# Patient Record
Sex: FEMALE | Race: WHITE | Employment: OTHER | URBAN - METROPOLITAN AREA
[De-identification: names, ages, dates, MRNs, and addresses within clinical notes are randomized per-mention and may not be internally consistent; named-entity substitution may affect disease eponyms.]

---

## 2018-03-01 ENCOUNTER — HOSPITAL ENCOUNTER (OUTPATIENT)
Dept: MAMMOGRAPHY | Age: 73
Discharge: HOME | End: 2018-03-03
Payer: MEDICARE

## 2018-03-01 DIAGNOSIS — M81.0 OSTEOPOROSIS, POST-MENOPAUSAL: ICD-10-CM

## 2018-03-01 DIAGNOSIS — Z12.31 VISIT FOR SCREENING MAMMOGRAM: ICD-10-CM

## 2018-03-01 PROCEDURE — 77080 DXA BONE DENSITY AXIAL: CPT

## 2018-03-01 PROCEDURE — 77067 SCR MAMMO BI INCL CAD: CPT

## 2019-01-24 PROBLEM — I49.3 PVC (PREMATURE VENTRICULAR CONTRACTION): Status: ACTIVE | Noted: 2019-01-24

## 2019-03-04 ENCOUNTER — HOSPITAL ENCOUNTER (OUTPATIENT)
Dept: MAMMOGRAPHY | Age: 74
Discharge: HOME | End: 2019-03-06
Payer: MEDICARE

## 2019-03-04 ENCOUNTER — HOSPITAL ENCOUNTER (OUTPATIENT)
Dept: GENERAL RADIOLOGY | Facility: CLINIC | Age: 74
Discharge: HOME | End: 2019-03-06
Payer: MEDICARE

## 2019-03-04 DIAGNOSIS — M54.2 NECK PAIN: ICD-10-CM

## 2019-03-04 DIAGNOSIS — Z12.31 ENCOUNTER FOR SCREENING MAMMOGRAM FOR BREAST CANCER: ICD-10-CM

## 2019-03-04 DIAGNOSIS — R29.898 LEFT ARM WEAKNESS: ICD-10-CM

## 2019-03-04 PROCEDURE — 77063 BREAST TOMOSYNTHESIS BI: CPT

## 2019-03-04 PROCEDURE — 72040 X-RAY EXAM NECK SPINE 2-3 VW: CPT

## 2019-03-09 ENCOUNTER — HOSPITAL ENCOUNTER (OUTPATIENT)
Dept: MRI IMAGING | Age: 74
Discharge: HOME | End: 2019-03-11
Payer: MEDICARE

## 2019-03-09 DIAGNOSIS — R29.898 LEFT ARM WEAKNESS: ICD-10-CM

## 2019-03-09 DIAGNOSIS — M54.2 NECK PAIN: ICD-10-CM

## 2019-03-09 PROCEDURE — 72141 MRI NECK SPINE W/O DYE: CPT

## 2019-08-05 PROBLEM — E86.9 VOLUME DEPLETION: Status: ACTIVE | Noted: 2017-06-26

## 2019-08-05 PROBLEM — D49.0 NEOPLASM OF APPENDIX: Status: ACTIVE | Noted: 2017-06-26

## 2019-08-05 PROBLEM — I49.3 VENTRICULAR PREMATURE BEATS: Status: ACTIVE | Noted: 2017-06-26

## 2019-08-05 PROBLEM — K59.09 CHRONIC CONSTIPATION: Status: ACTIVE | Noted: 2017-06-26

## 2019-08-05 PROBLEM — M25.559 HIP PAIN: Status: ACTIVE | Noted: 2017-06-12

## 2019-08-05 PROBLEM — M85.80 OSTEOPENIA: Status: ACTIVE | Noted: 2017-06-26

## 2019-08-05 PROBLEM — R79.89 OTHER SPECIFIED ABNORMAL FINDINGS OF BLOOD CHEMISTRY: Status: ACTIVE | Noted: 2019-04-30

## 2019-08-05 PROBLEM — E78.5 HYPERLIPIDEMIA: Status: ACTIVE | Noted: 2018-01-24

## 2019-08-05 PROBLEM — R10.84 GENERALIZED ABDOMINAL PAIN: Status: ACTIVE | Noted: 2019-04-30

## 2019-08-05 PROBLEM — M81.0 SENILE OSTEOPOROSIS: Status: ACTIVE | Noted: 2019-02-01

## 2020-03-16 ENCOUNTER — HOSPITAL ENCOUNTER (OUTPATIENT)
Dept: MAMMOGRAPHY | Age: 75
Discharge: HOME | End: 2020-03-18
Payer: MEDICARE

## 2020-03-16 PROCEDURE — 77063 BREAST TOMOSYNTHESIS BI: CPT

## 2020-03-16 PROCEDURE — 77080 DXA BONE DENSITY AXIAL: CPT

## 2020-08-24 PROBLEM — M54.12 CERVICAL RADICULOPATHY: Status: ACTIVE | Noted: 2020-08-24

## 2021-04-07 ENCOUNTER — HOSPITAL ENCOUNTER (OUTPATIENT)
Dept: MAMMOGRAPHY | Age: 76
Discharge: HOME | End: 2021-04-09
Payer: MEDICARE

## 2021-04-07 DIAGNOSIS — Z12.31 ENCOUNTER FOR SCREENING MAMMOGRAM FOR BREAST CANCER: ICD-10-CM

## 2021-04-07 PROCEDURE — 77063 BREAST TOMOSYNTHESIS BI: CPT

## 2021-05-25 ENCOUNTER — HOSPITAL ENCOUNTER (EMERGENCY)
Facility: CLINIC | Age: 76
Discharge: HOME | End: 2021-05-26
Attending: EMERGENCY MEDICINE
Payer: MEDICARE

## 2021-05-25 ENCOUNTER — APPOINTMENT (OUTPATIENT)
Dept: GENERAL RADIOLOGY | Facility: CLINIC | Age: 76
End: 2021-05-25
Payer: MEDICARE

## 2021-05-25 VITALS
HEART RATE: 71 BPM | OXYGEN SATURATION: 96 % | BODY MASS INDEX: 28.93 KG/M2 | DIASTOLIC BLOOD PRESSURE: 91 MMHG | HEIGHT: 66 IN | TEMPERATURE: 97.9 F | WEIGHT: 180 LBS | SYSTOLIC BLOOD PRESSURE: 129 MMHG | RESPIRATION RATE: 15 BRPM

## 2021-05-25 DIAGNOSIS — S20.212A CONTUSION OF RIB ON LEFT SIDE, INITIAL ENCOUNTER: ICD-10-CM

## 2021-05-25 DIAGNOSIS — S20.212A CONTUSION OF LEFT CHEST WALL, INITIAL ENCOUNTER: Primary | ICD-10-CM

## 2021-05-25 PROCEDURE — 99284 EMERGENCY DEPT VISIT MOD MDM: CPT

## 2021-05-25 PROCEDURE — 93005 ELECTROCARDIOGRAM TRACING: CPT | Performed by: EMERGENCY MEDICINE

## 2021-05-25 PROCEDURE — 71101 X-RAY EXAM UNILAT RIBS/CHEST: CPT

## 2021-05-25 ASSESSMENT — PAIN DESCRIPTION - PAIN TYPE: TYPE: ACUTE PAIN

## 2021-05-25 ASSESSMENT — PAIN DESCRIPTION - ORIENTATION: ORIENTATION: LEFT

## 2021-05-25 ASSESSMENT — PAIN DESCRIPTION - DESCRIPTORS: DESCRIPTORS: SHARP

## 2021-05-26 ASSESSMENT — ENCOUNTER SYMPTOMS
RHINORRHEA: 0
DIARRHEA: 0
EYE PAIN: 0
BACK PAIN: 0
NAUSEA: 0
SORE THROAT: 0
SHORTNESS OF BREATH: 0
ABDOMINAL PAIN: 0
VOMITING: 0
COUGH: 0

## 2021-05-26 NOTE — ED PROVIDER NOTES
(ADDERALL XR) 10 MG extended release capsule Take 20 mg by mouth every morning. vilazodone HCl (VILAZODONE HCL) 20 MG TABS Take 20 mg by mouth daily      atorvastatin (LIPITOR) 10 MG tablet Take 1 tablet by mouth daily  Qty: 90 tablet, Refills: 3      magnesium oxide (MAG-OX) 400 MG tablet Take 400 mg by mouth daily      polyethylene glycol (GLYCOLAX) 17 GM/SCOOP powder Take 17 g by mouth daily      denosumab (PROLIA) 60 MG/ML SOSY SC injection Inject 60 mg into the skin Indications: every 6 months      dilTIAZem (CARDIZEM CD) 120 MG extended release capsule Take 1 capsule by mouth daily  Qty: 90 capsule, Refills: 3      calcium carbonate (OSCAL) 500 MG TABS tablet Take 1,200 mg by mouth daily       aspirin 325 MG tablet Take 81 mg by mouth daily       Biotin 10 MG CAPS Take 1 capsule by mouth daily. ALLERGIES     has No Known Allergies. FAMILY HISTORY     She indicated that the status of her mother is unknown. She indicated that the status of her father is unknown.     family history includes Cancer in her brother and father; Diabetes in her brother, father, and sister; High Cholesterol in her father; Hypertension in her brother, father, and sister; Osteoarthritis in her mother; Stroke in her father; Substance Abuse in her sister. SOCIAL HISTORY      reports that she quit smoking about 33 years ago. Her smoking use included cigarettes. She has a 2.50 pack-year smoking history. She has never used smokeless tobacco. She reports current alcohol use of about 11.7 standard drinks of alcohol per week. She reports that she does not use drugs. PHYSICAL EXAM     INITIAL VITALS:  height is 5' 6\" (1.676 m) and weight is 81.6 kg (180 lb). Her oral temperature is 97.9 °F (36.6 °C). Her blood pressure is 129/91 (abnormal) and her pulse is 71. Her respiration is 15 and oxygen saturation is 96%. Physical Exam  Vitals reviewed. Constitutional:       General: She is not in acute distress. placed on the left lateral rib cage. No rib fracture is   detected. No other acute intrathoracic pathology. No results found. LABS:  No results found for this visit on 05/25/21. EMERGENCY DEPARTMENT COURSE:     The patient was given the following medications:  No orders of the defined types were placed in this encounter. Vitals:    Vitals:    05/25/21 2242   BP: (!) 129/91   Pulse: 71   Resp: 15   Temp: 97.9 °F (36.6 °C)   TempSrc: Oral   SpO2: 96%   Weight: 81.6 kg (180 lb)   Height: 5' 6\" (1.676 m)     -------------------------  BP: (!) 129/91, Temp: 97.9 °F (36.6 °C), Pulse: 71, Resp: 15      Re-evaluation Notes    Patient doing well on reevaluation. No acute changes. I did compare today's ECG with the prior ECG and see no acute significant changes. X-ray shows no acute obvious findings. Clinically I feel she has a rib contusion versus small undetectable fracture via x-ray. No obvious gross rib fracture or deformity. No signs of pneumothorax. I feel she is appropriate for discharge and outpatient follow-up. Advised to follow-up with her PCP return right away if worsening or for new or concerning symptoms. I discussed all this with the patient and she is comfortable/agreeable with the plan. I do not feel she requires further cardiac work-up. I feel this is trauma induced. The patient presents with chest pain that is not suggesting in nature of pulmonary embolus, aortic dissection, cardiac ischemia, or other serious etiology. I considered an aortic dissection, but this is unlikely as patient is not complaining of tearing or ripping chest pain that is radiating to the back, the patient has no new neurological abnormalities and pulses are equal to all extremities. Mediastinum is within normal limits. Patient appears comfortable on physical exam and is not in distress. I also thought about a cardiac tamponade, but this is unlikely as patient is hemodynamically stable.  Heart sounds are not distant, EKG does not show signs of electrical alternans and there is no JVD. I also thought about a tension pneumothorax, but this is unlikely given bilateral breath sounds and no signs of hemodynamic instability. I do not feel the patient has a PE. No clinical evidence of DVT. I thought about an esophageal perforation, but history and physical exam does not suggest vomiting, followed by chest pain. No signs of Hamman's crunch on physical exam; again, patient appears comfortable and is well appearing and non toxic. The patient has been instructed to return if the symptpoms change or worsen in any way. Given the extremely low risk of these diagnoses further testing and evaluation for these possibilites are not indicated at this time. The patient appears stable for discharge and has been instructed to return immediately if the symptoms worsen in any way, or in 8-12 hr if not improved for re-evaluation. We also discussed returning to the Emergency Department immediately if new or worsening symptoms occur. We have discussed the symptoms which are most concerning (e.g., worsening pain, shortness of breath, a feeling of passing out, fever, any neurologic symptoms, abdominal pain or vomiting) that necessitate immediate return. The patient understands that at this time there is no evidence for a more malignant underlying process, but the patient also understands that early in the process of an illness or injury, an emergency department workup can be falsely reassuring. Routine discharge counseling was given, and the patient understands that worsening, changing or persistent symptoms should prompt an immediate call or follow up with their primary physician or return to the emergency department. The importance of appropriate follow up was also discussed. I have reviewed the disposition diagnosis with the patient and or their family/guardian.   I have answered their questions and given discharge instructions. They voiced understanding of these instructions and did not have any further questions or complaints. CONSULTS:    None    CRITICAL CARE:     None    PROCEDURES:    None    FINAL IMPRESSION      1. Contusion of left chest wall, initial encounter    2.  Contusion of rib on left side, initial encounter          DISPOSITION/PLAN   DISPOSITION        Condition on Disposition    Improved    PATIENT REFERRED TO:  Chelsea Chairez DO  [address] [facility] [address]  [phone]    Schedule an appointment as soon as possible for a visit in 2 days        DISCHARGE MEDICATIONS:  Current Discharge Medication List          (Please note that portions of this note were completed with a voice recognition program.  Efforts were made to edit the dictations but occasionally words are mis-transcribed.)    Hood Mackey DO, DO  Attending Emergency Physician       Hood Mackey DO  05/26/21 Dae Kwon DO  05/26/21 0155

## 2021-05-26 NOTE — ED NOTES
Pt presents to the ED via private auto. Pt states she fell while walking her dog onto her left side.  Pt c/o left rib pain     Myranda Garcia RN  05/26/21 [id number]

## 2021-05-27 LAB
EKG ATRIAL RATE: 65 BPM
EKG P AXIS: 43 DEGREES
EKG P-R INTERVAL: 200 MS
EKG Q-T INTERVAL: 432 MS
EKG QRS DURATION: 88 MS
EKG QTC CALCULATION (BAZETT): 449 MS
EKG R AXIS: 52 DEGREES
EKG T AXIS: 55 DEGREES
EKG VENTRICULAR RATE: 65 BPM

## 2021-09-06 ENCOUNTER — HOSPITAL ENCOUNTER (EMERGENCY)
Facility: CLINIC | Age: 76
Discharge: HOME | End: 2021-09-06
Attending: EMERGENCY MEDICINE
Payer: MEDICARE

## 2021-09-06 VITALS
HEIGHT: 66 IN | BODY MASS INDEX: 28.93 KG/M2 | OXYGEN SATURATION: 97 % | TEMPERATURE: 97.8 F | HEART RATE: 62 BPM | DIASTOLIC BLOOD PRESSURE: 89 MMHG | WEIGHT: 180 LBS | RESPIRATION RATE: 17 BRPM | SYSTOLIC BLOOD PRESSURE: 153 MMHG

## 2021-09-06 DIAGNOSIS — L23.7 POISON IVY DERMATITIS: Primary | ICD-10-CM

## 2021-09-06 PROCEDURE — 99283 EMERGENCY DEPT VISIT LOW MDM: CPT

## 2021-09-06 PROCEDURE — 6370000000 HC RX 637 (ALT 250 FOR IP): Performed by: EMERGENCY MEDICINE

## 2021-09-06 RX ORDER — PREDNISONE 20 MG/1
60 TABLET ORAL ONCE
Status: COMPLETED | OUTPATIENT
Start: 2021-09-06 | End: 2021-09-06

## 2021-09-06 RX ORDER — PREDNISONE 50 MG/1
50 TABLET ORAL DAILY
Qty: 4 TABLET | Refills: 0 | Status: SHIPPED | OUTPATIENT
Start: 2021-09-07 | End: 2021-09-10

## 2021-09-06 RX ADMIN — PREDNISONE 60 MG: 20 TABLET ORAL at 08:45

## 2021-09-06 NOTE — ED TRIAGE NOTES
Pt to ED with steady gait with c/e right sided facial swelling, and rash on bilat arms. Pt reports two days ago she came into contactwith poisonous plant while gardening. Pt reports itching, and swelling. Pt denies vision changes. Airway remains in tact. Pt is alert and oriented, NAD, RR even and unlabored.

## 2021-09-06 NOTE — ED PROVIDER NOTES
[facility]  [address] [facility]  Phone: [address] [place]      Pt Name: Prosper Shirley  MRN: [id number]  [place] 1945  Date of evaluation: 9/6/2021    CHIEF COMPLAINT       Chief Complaint   Patient presents with    Facial Swelling     pt reports came into contact with some plant while gardening two days ago and now has swelling on right side of face and eye, denies vision changes    Rash     to bilat arms and right side of face       HISTORY OF PRESENT ILLNESS    Prosper Shirley is a 67 y.o. female who presents to the emergency department planing of a rash to her right lower lid right side of her face and bilateral arms in the antecubital fossa's. She was doing some gardening/weeding. Symptoms started about 2 days ago. No blurry vision or double vision. She has some crusting near her right eye but not involving it. She denies any difficulty swallowing or difficulty breathing no other complaints. 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 SURGICAL HISTORY      has a past surgical history that includes Small intestine surgery; Carpal tunnel release; Eye surgery; Appendectomy; Gallbladder surgery; jorge l and bso (cervix removed); Total hip arthroplasty (Bilateral); and Neck surgery (07/2019).     CURRENT MEDICATIONS       Previous Medications    AMPHETAMINE-DEXTROAMPHETAMINE (ADDERALL XR) 20 MG EXTENDED RELEASE CAPSULE    Take 1 capsule by mouth daily. ASPIRIN 325 MG TABLET    Take 81 mg by mouth daily     ATORVASTATIN (LIPITOR) 10 MG TABLET    Take 1 tablet by mouth daily    BIOTIN 10 MG CAPS    Take 1 capsule by mouth daily. CALCIUM CARBONATE (OSCAL) 500 MG TABS TABLET    Take 1,200 mg by mouth daily     DENOSUMAB (PROLIA) 60 MG/ML SOSY SC INJECTION    Inject 60 mg into the skin Indications: every 6 months    DILTIAZEM (CARDIZEM CD) 120 MG EXTENDED RELEASE CAPSULE    Take 1 capsule by mouth daily    MAGNESIUM OXIDE (MAG-OX) 400 MG TABLET    Take 400 mg by mouth daily    POLYETHYLENE GLYCOL (GLYCOLAX) 17 GM/SCOOP POWDER    Take 17 g by mouth daily    TIZANIDINE (ZANAFLEX) 2 MG TABLET    Take 1 tablet by mouth every 8 hours as needed (muscle pain)    VILAZODONE HCL (VILAZODONE HCL) 20 MG TABS    Take 20 mg by mouth daily       ALLERGIES     has No Known Allergies. FAMILY HISTORY     She indicated that the status of her mother is unknown. She indicated that the status of her father is unknown.     family history includes Cancer in her brother and father; Diabetes in her brother, father, and sister; High Cholesterol in her father; Hypertension in her brother, father, and sister; Osteoarthritis in her mother; Stroke in her father; Substance Abuse in her sister. SOCIAL HISTORY      reports that she quit smoking about 33 years ago. Her smoking use included cigarettes. She has a 2.50 pack-year smoking history. She has never used smokeless tobacco. She reports current alcohol use of about 11.7 standard drinks of alcohol per week. She reports that she does not use drugs.     PHYSICAL EXAM       ED Triage Vitals [09/06/21 0833]   BP Temp Temp Source Pulse Resp SpO2 Height Weight   (!) 153/89 97.8 °F (36.6 °C) Oral 62 17 97 % 5' 6\" (1.676 m) 180 lb (81.6 kg)       Constitutional: Alert, oriented x3, nontoxic, answering questions appropriately, acting properly for age, in no acute distress   HEENT: Extraocular muscles intact, mucus membranes moist, no angioedema no uvular edema no o posterior no pharyngeal erythema or exudates, Pupils equal, round, reactive to light, rash consistent with poison ivy in the right side of the face over the maxillary sinus and right lower lid  Neck: Trachea midline no stridor or meningismus  Cardiovascular: Regular rhythm and rate no S3, S4, or murmurs   Respiratory: Clear to auscultation bilaterally no wheezes, rhonchi, rales, no respiratory distress no tachypnea no retractions no hypoxia  Gastrointestinal: Soft, nontender, nondistended, positive bowel sounds. No rebound, rigidity, or guarding. Musculoskeletal: No extremity pain or swelling   Neurologic: Moving all 4 extremities without difficulty there are no gross focal neurologic deficits   Skin: Warm and dry rash consistent with poison ivy bilateral antecubital fossa's left greater than right. Erythematous blanching pruritic      DIFFERENTIAL DIAGNOSIS/ MDM:     Rash consistent with poison ivy. Prednisone. May take Benadryl as directed as needed. May use calamine. Follow-up with family physician return if worsening symptoms or any other concerns. DIAGNOSTIC RESULTS     EKG: All EKG's are interpreted by the Emergency Department Physician who either signs or Co-signs this chart in the absence of a cardiologist.        Not indicated unless otherwise documented above    LABS:  No results found for this visit on 09/06/21.     Not indicated unless otherwise documented above    RADIOLOGY:   I reviewed the radiologist interpretations:    No orders to display       Not indicated unless otherwise documented above    EMERGENCY DEPARTMENT COURSE:     The patient was given the following medications:  Orders Placed This Encounter   Medications    predniSONE (DELTASONE) tablet 60 mg    predniSONE (DELTASONE) 50 MG tablet     Sig: Take 1 tablet by mouth daily for 4 days     Dispense:  4 tablet     Refill:  0        Vitals:   -------------------------  BP (!) 153/89   Pulse 62   Temp 97.8 °F (36.6 °C) (Oral)   Resp 17   Ht 5' 6\" (1.676 m)   Wt 81.6 kg (180 lb)   SpO2 97%   BMI 29.05 kg/m²           The patient understands that at this time there is no evidence for a more malignant underlying process, but also understands that early in the process of an illness or injury, an emergency department workup can be falsely reassuring. Routine discharge counseling was given, and it is understood that worsening, changing or persistent symptoms should prompt an immediate call or follow up with their primary physician or return to the emergency department. The importance of appropriate follow up was also discussed. I have reviewed the disposition diagnosis. I have answered the questions and given discharge instructions. There was voiced understanding of these instructions and no further questions or complaints. CRITICAL CARE:    None    CONSULTS:    None    PROCEDURES:    None      OARRS Report if indicated             FINAL IMPRESSION      1.  Poison ivy dermatitis          DISPOSITION/PLAN   DISPOSITION Discharge - Pending Orders Complete 09/06/2021 08:43:19 AM        CONDITION ON DISPOSITION: STABLE       PATIENT REFERRED TO:  Jr Langley DO  [address] [place], [place] [id number] [phone]    Schedule an appointment as soon as possible for a visit in 3 days        DISCHARGE MEDICATIONS:  New Prescriptions    PREDNISONE (DELTASONE) 50 MG TABLET    Take 1 tablet by mouth daily for 4 days       (Please note that portions of this note were completed with a voice recognition program.  Efforts were made to edit the dictations but occasionally words are mis-transcribed.)    Cherelle Zepeda DO   Attending Emergency Physician      Cherelle Zepeda DO  09/06/21 3393

## 2022-01-03 ENCOUNTER — HOSPITAL ENCOUNTER (OUTPATIENT)
Facility: CLINIC | Age: 77
Discharge: HOME | End: 2022-01-03
Payer: MEDICARE

## 2022-01-03 DIAGNOSIS — R73.01 IMPAIRED FASTING GLUCOSE: ICD-10-CM

## 2022-01-03 DIAGNOSIS — E78.2 MIXED HYPERLIPIDEMIA: ICD-10-CM

## 2022-01-03 LAB
ALBUMIN SERPL-MCNC: 4.4 G/DL (ref 3.5–5.2)
ALBUMIN/GLOBULIN RATIO: 1.7 (ref 1–2.5)
ALP BLD-CCNC: 55 U/L (ref 35–104)
ALT SERPL-CCNC: 11 U/L (ref 5–33)
ANION GAP SERPL CALCULATED.3IONS-SCNC: 13 MMOL/L (ref 9–17)
AST SERPL-CCNC: 20 U/L
BILIRUB SERPL-MCNC: 0.41 MG/DL (ref 0.3–1.2)
BUN BLDV-MCNC: 17 MG/DL (ref 8–23)
BUN/CREAT BLD: NORMAL (ref 9–20)
CALCIUM SERPL-MCNC: 9.3 MG/DL (ref 8.6–10.4)
CHLORIDE BLD-SCNC: 102 MMOL/L (ref 98–107)
CHOLESTEROL/HDL RATIO: 2.7
CHOLESTEROL: 175 MG/DL
CO2: 24 MMOL/L (ref 20–31)
CREAT SERPL-MCNC: 0.66 MG/DL (ref 0.5–0.9)
GFR AFRICAN AMERICAN: >60 ML/MIN
GFR NON-AFRICAN AMERICAN: >60 ML/MIN
GFR SERPL CREATININE-BSD FRML MDRD: NORMAL ML/MIN/{1.73_M2}
GFR SERPL CREATININE-BSD FRML MDRD: NORMAL ML/MIN/{1.73_M2}
GLUCOSE FASTING: 88 MG/DL (ref 70–99)
HDLC SERPL-MCNC: 64 MG/DL
LDL CHOLESTEROL: 91 MG/DL (ref 0–130)
POTASSIUM SERPL-SCNC: 4.8 MMOL/L (ref 3.7–5.3)
SODIUM BLD-SCNC: 139 MMOL/L (ref 135–144)
TOTAL PROTEIN: 7 G/DL (ref 6.4–8.3)
TRIGL SERPL-MCNC: 98 MG/DL
VLDLC SERPL CALC-MCNC: NORMAL MG/DL (ref 1–30)

## 2022-01-03 PROCEDURE — 36415 COLL VENOUS BLD VENIPUNCTURE: CPT

## 2022-01-03 PROCEDURE — 83036 HEMOGLOBIN GLYCOSYLATED A1C: CPT

## 2022-01-03 PROCEDURE — 80053 COMPREHEN METABOLIC PANEL: CPT

## 2022-01-03 PROCEDURE — 80061 LIPID PANEL: CPT

## 2022-01-04 LAB
ESTIMATED AVERAGE GLUCOSE: 114 MG/DL
HBA1C MFR BLD: 5.6 % (ref 4–6)

## 2022-01-31 ENCOUNTER — HOSPITAL ENCOUNTER (OUTPATIENT)
Dept: GENERAL RADIOLOGY | Facility: CLINIC | Age: 77
Discharge: HOME | End: 2022-02-02
Payer: MEDICARE

## 2022-01-31 DIAGNOSIS — M25.511 CHRONIC RIGHT SHOULDER PAIN: ICD-10-CM

## 2022-01-31 DIAGNOSIS — G89.29 CHRONIC RIGHT SHOULDER PAIN: ICD-10-CM

## 2022-01-31 PROBLEM — F33.41 RECURRENT MAJOR DEPRESSIVE DISORDER, IN PARTIAL REMISSION (HCC): Status: ACTIVE | Noted: 2022-01-31

## 2022-01-31 PROCEDURE — 73030 X-RAY EXAM OF SHOULDER: CPT

## 2022-02-15 ENCOUNTER — HOSPITAL ENCOUNTER (OUTPATIENT)
Dept: GENERAL RADIOLOGY | Age: 77
Discharge: HOME | End: 2022-02-17
Payer: MEDICARE

## 2022-02-15 ENCOUNTER — HOSPITAL ENCOUNTER (OUTPATIENT)
Age: 77
Discharge: HOME | End: 2022-02-17
Payer: MEDICARE

## 2022-02-15 DIAGNOSIS — M79.604 LEG PAIN, RIGHT: ICD-10-CM

## 2022-02-15 PROCEDURE — 77073 BONE LENGTH STUDIES: CPT

## 2022-05-20 ENCOUNTER — HOSPITAL ENCOUNTER (OUTPATIENT)
Dept: MAMMOGRAPHY | Age: 77
Discharge: HOME | End: 2022-05-22
Payer: MEDICARE

## 2022-05-20 DIAGNOSIS — Z12.31 VISIT FOR SCREENING MAMMOGRAM: ICD-10-CM

## 2022-05-20 DIAGNOSIS — Z78.0 POSTMENOPAUSAL: ICD-10-CM

## 2022-05-20 PROCEDURE — 77063 BREAST TOMOSYNTHESIS BI: CPT

## 2022-05-20 PROCEDURE — 77080 DXA BONE DENSITY AXIAL: CPT

## 2023-02-17 ENCOUNTER — HOSPITAL ENCOUNTER (OUTPATIENT)
Age: 78
Setting detail: SPECIMEN
Discharge: HOME | End: 2023-02-17

## 2023-02-17 DIAGNOSIS — R73.01 IMPAIRED FASTING GLUCOSE: ICD-10-CM

## 2023-02-17 DIAGNOSIS — E78.2 MIXED HYPERLIPIDEMIA: ICD-10-CM

## 2023-02-17 LAB
ALBUMIN SERPL-MCNC: 4.2 G/DL (ref 3.5–5.2)
ALBUMIN/GLOBULIN RATIO: 1.8 (ref 1–2.5)
ALP SERPL-CCNC: 51 U/L (ref 35–104)
ALT SERPL-CCNC: 9 U/L (ref 5–33)
ANION GAP SERPL CALCULATED.3IONS-SCNC: 14 MMOL/L (ref 9–17)
AST SERPL-CCNC: 17 U/L
BILIRUB SERPL-MCNC: 0.3 MG/DL (ref 0.3–1.2)
BUN SERPL-MCNC: 18 MG/DL (ref 8–23)
CALCIUM SERPL-MCNC: 8.7 MG/DL (ref 8.6–10.4)
CHLORIDE SERPL-SCNC: 105 MMOL/L (ref 98–107)
CHOLEST SERPL-MCNC: 153 MG/DL
CHOLESTEROL/HDL RATIO: 2.8
CO2 SERPL-SCNC: 22 MMOL/L (ref 20–31)
CREAT SERPL-MCNC: 0.83 MG/DL (ref 0.5–0.9)
EST. AVERAGE GLUCOSE BLD GHB EST-MCNC: 117 MG/DL
GFR SERPL CREATININE-BSD FRML MDRD: >60 ML/MIN/1.73M2
GLUCOSE SERPL-MCNC: 109 MG/DL (ref 70–99)
HBA1C MFR BLD: 5.7 % (ref 4–6)
HDLC SERPL-MCNC: 55 MG/DL
LDLC SERPL CALC-MCNC: 61 MG/DL (ref 0–130)
POTASSIUM SERPL-SCNC: 5.1 MMOL/L (ref 3.7–5.3)
PROT SERPL-MCNC: 6.5 G/DL (ref 6.4–8.3)
SODIUM SERPL-SCNC: 141 MMOL/L (ref 135–144)
TRIGL SERPL-MCNC: 185 MG/DL

## 2023-05-31 ENCOUNTER — HOSPITAL ENCOUNTER (EMERGENCY)
Facility: CLINIC | Age: 78
Discharge: HOME | End: 2023-05-31
Attending: EMERGENCY MEDICINE
Payer: MEDICARE

## 2023-05-31 VITALS
DIASTOLIC BLOOD PRESSURE: 79 MMHG | SYSTOLIC BLOOD PRESSURE: 127 MMHG | RESPIRATION RATE: 18 BRPM | BODY MASS INDEX: 29.73 KG/M2 | HEIGHT: 66 IN | OXYGEN SATURATION: 95 % | WEIGHT: 185 LBS | TEMPERATURE: 98.8 F | HEART RATE: 80 BPM

## 2023-05-31 DIAGNOSIS — N30.90 CYSTITIS: Primary | ICD-10-CM

## 2023-05-31 LAB
BACTERIA URNS QL MICRO: ABNORMAL
BILIRUB UR QL STRIP: NEGATIVE
CHARACTER UR: ABNORMAL
CLARITY UR: ABNORMAL
COLOR UR: YELLOW
EPI CELLS #/AREA URNS HPF: ABNORMAL /HPF (ref 0–5)
GLUCOSE UR STRIP-MCNC: NEGATIVE MG/DL
HGB UR QL STRIP.AUTO: ABNORMAL
KETONES UR STRIP-MCNC: NEGATIVE MG/DL
LEUKOCYTE ESTERASE UR QL STRIP: ABNORMAL
NITRITE UR QL STRIP: POSITIVE
PH UR STRIP: 7 [PH] (ref 5–8)
PROT UR STRIP-MCNC: ABNORMAL MG/DL
RBC #/AREA URNS HPF: ABNORMAL /HPF (ref 0–2)
SP GR UR STRIP: 1.02 (ref 1–1.03)
UROBILINOGEN UR STRIP-ACNC: NORMAL
WBC #/AREA URNS HPF: ABNORMAL /HPF (ref 0–5)

## 2023-05-31 PROCEDURE — 87186 SC STD MICRODIL/AGAR DIL: CPT

## 2023-05-31 PROCEDURE — 99283 EMERGENCY DEPT VISIT LOW MDM: CPT

## 2023-05-31 PROCEDURE — 87086 URINE CULTURE/COLONY COUNT: CPT

## 2023-05-31 PROCEDURE — 86403 PARTICLE AGGLUT ANTBDY SCRN: CPT

## 2023-05-31 PROCEDURE — 87088 URINE BACTERIA CULTURE: CPT

## 2023-05-31 PROCEDURE — 81001 URINALYSIS AUTO W/SCOPE: CPT

## 2023-05-31 RX ORDER — CEPHALEXIN 500 MG/1
500 CAPSULE ORAL 2 TIMES DAILY
Qty: 10 CAPSULE | Refills: 0 | Status: SHIPPED | OUTPATIENT
Start: 2023-05-31 | End: 2023-06-05

## 2023-05-31 ASSESSMENT — PAIN - FUNCTIONAL ASSESSMENT: PAIN_FUNCTIONAL_ASSESSMENT: NONE - DENIES PAIN

## 2023-05-31 NOTE — ED NOTES
Pt presents to ED c/o UTI symptoms for the past 5 days. Pt states that last week she had a cystoscopy and states that she has been experiencing urinary frequency and cloudy urine ever since. Pt denies abdominal pain or burning with urination. Pt arrives A/Ox4, PWD, PMS intact. Pt resting on stretcher with call light in reach.      Levar Shine RN  05/31/23 [id number]

## 2023-05-31 NOTE — ED PROVIDER NOTES
EMERGENCY DEPARTMENT ENCOUNTER      Pt Name: Charles Rust  MRN: [id number]  [place] 1945  Date of evaluation: 5/31/2023  Provider: Margarito Luis PA-C    CHIEF COMPLAINT       Chief Complaint   Patient presents with    Urinary Frequency         HISTORY OF PRESENT ILLNESS      Charles Rust is a 65 y.o. female who presents to the emergency department complaining of foul-smelling urine, frequency that started about 5 days ago. Patient states that 5 days ago she did have a cystoscopy from her urologist for evaluation of her enlarged bladder. She states that ever since the cystoscopy she has had a bit of irritation with urination. 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 of appendix, bowel and GB, 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     History of neck surgery 07/2019    PVC (premature ventricular contraction)          SURGICAL HISTORY       Past Surgical History:   Procedure Laterality Date    APPENDECTOMY      CARPAL TUNNEL RELEASE      2003 + 2007    EYE SURGERY      tubes - tear duct    GALLBLADDER SURGERY      NECK SURGERY  07/2019    C-spine discectomy and fusion    SMALL INTESTINE SURGERY      Bowel resection 1997    SANTIAGO AND BSO (CERVIX REMOVED)      1997    TOTAL HIP ARTHROPLASTY Bilateral          CURRENT MEDICATIONS       Previous Medications    AMPHETAMINE-DEXTROAMPHETAMINE (ADDERALL) 12.5 MG TABLET    take 1 tablet by mouth three times a day    ASPIRIN 325 MG TABLET    Take 81 mg by mouth daily     ATORVASTATIN (LIPITOR) 10 MG TABLET    take 1 tablet by mouth once daily    BIOTIN 10 MG CAPS    Take 1 capsule by mouth daily.     BUPROPION (WELLBUTRIN XL) 300 MG EXTENDED RELEASE TABLET    take 1 tablet by mouth daily    CALCIUM CARBONATE (OSCAL) 500 MG TABS TABLET    Take 1,200 mg by mouth daily

## 2023-05-31 NOTE — ED PROVIDER NOTES
[facility] ED    [address] [facility]  Phone: [phone]  Emergency Department  Faculty Attestation    I performed a history and physical examination of the patient and discussed management with the mid level provideer. I reviewed the mid level provider's note and agree with the documented findings and plan of care. Any areas of disagreement are noted on the chart. I was personally present for the key portions of any procedures. I have documented in the chart those procedures where I was not present during the key portions. I have reviewed the emergency nurses triage note. I agree with the chief complaint, past medical history, past surgical history, allergies, medications, social and family history as documented unless otherwise noted below. Documentation of the HPI, Physical Exam and Medical Decision Making performed by medical students or scribes is based on my personal performance of the HPI, PE and MDM. For Physician Assistant/ Nurse Practitioner cases/documentation I have personally evaluated this patient and have completed at least one if not all key elements of the E/M (history, physical exam, and MDM). Additional findings are as noted. Primary Care Physician:  Barry Davis DO    CHIEF COMPLAINT       Chief Complaint   Patient presents with    Urinary Frequency       RECENT VITALS:   Temp: 98.8 °F (37.1 °C),  Pulse: 80, Respirations: 18, BP: 127/79    LABS:  Labs Reviewed   URINALYSIS WITH REFLEX TO CULTURE         PERTINENT ATTENDING PHYSICIAN COMMENTS:    The patient presents with dysuria. She just recently had a cystoscopy because she has a large bladder and they were concerned about this. Her urologist did not write for any antibiotics. She says the cystoscopy went well. She is going to be having an ultrasound to follow-up. She is not having any fever or vomiting. She denies back pain. On exam, the patient has no flank discomfort to palpation or percussion.   She

## 2023-06-01 LAB
MICROORGANISM SPEC CULT: ABNORMAL
MICROORGANISM SPEC CULT: ABNORMAL
SPECIMEN DESCRIPTION: ABNORMAL

## 2024-02-19 ENCOUNTER — ANESTHESIA EVENT (OUTPATIENT)
Dept: OPERATING ROOM | Age: 79
End: 2024-02-19
Payer: MEDICARE

## 2024-02-19 ENCOUNTER — ANESTHESIA (OUTPATIENT)
Dept: OPERATING ROOM | Age: 79
End: 2024-02-19
Payer: MEDICARE

## 2024-02-19 ENCOUNTER — HOSPITAL ENCOUNTER (OUTPATIENT)
Age: 79
Setting detail: OUTPATIENT SURGERY
Discharge: HOME | End: 2024-02-19
Attending: OTOLARYNGOLOGY | Admitting: OTOLARYNGOLOGY
Payer: MEDICARE

## 2024-02-19 VITALS
TEMPERATURE: 97.9 F | RESPIRATION RATE: 15 BRPM | SYSTOLIC BLOOD PRESSURE: 161 MMHG | WEIGHT: 173 LBS | HEIGHT: 66 IN | HEART RATE: 68 BPM | OXYGEN SATURATION: 95 % | BODY MASS INDEX: 27.8 KG/M2 | DIASTOLIC BLOOD PRESSURE: 90 MMHG

## 2024-02-19 PROCEDURE — 3700000001 HC ADD 15 MINUTES (ANESTHESIA): Performed by: OTOLARYNGOLOGY

## 2024-02-19 PROCEDURE — 6360000002 HC RX W HCPCS: Performed by: NURSE ANESTHETIST, CERTIFIED REGISTERED

## 2024-02-19 PROCEDURE — 7100000011 HC PHASE II RECOVERY - ADDTL 15 MIN: Performed by: OTOLARYNGOLOGY

## 2024-02-19 PROCEDURE — 7100000000 HC PACU RECOVERY - FIRST 15 MIN: Performed by: OTOLARYNGOLOGY

## 2024-02-19 PROCEDURE — 2500000003 HC RX 250 WO HCPCS: Performed by: NURSE ANESTHETIST, CERTIFIED REGISTERED

## 2024-02-19 PROCEDURE — 6370000000 HC RX 637 (ALT 250 FOR IP): Performed by: OTOLARYNGOLOGY

## 2024-02-19 PROCEDURE — 6360000002 HC RX W HCPCS: Performed by: ANESTHESIOLOGY

## 2024-02-19 PROCEDURE — 6360000002 HC RX W HCPCS: Performed by: OTOLARYNGOLOGY

## 2024-02-19 PROCEDURE — 2709999900 HC NON-CHARGEABLE SUPPLY: Performed by: OTOLARYNGOLOGY

## 2024-02-19 PROCEDURE — 2500000003 HC RX 250 WO HCPCS: Performed by: OTOLARYNGOLOGY

## 2024-02-19 PROCEDURE — 3600000002 HC SURGERY LEVEL 2 BASE: Performed by: OTOLARYNGOLOGY

## 2024-02-19 PROCEDURE — 2720000010 HC SURG SUPPLY STERILE: Performed by: OTOLARYNGOLOGY

## 2024-02-19 PROCEDURE — 7100000010 HC PHASE II RECOVERY - FIRST 15 MIN: Performed by: OTOLARYNGOLOGY

## 2024-02-19 PROCEDURE — 2580000003 HC RX 258: Performed by: ANESTHESIOLOGY

## 2024-02-19 PROCEDURE — 7100000001 HC PACU RECOVERY - ADDTL 15 MIN: Performed by: OTOLARYNGOLOGY

## 2024-02-19 PROCEDURE — 3700000000 HC ANESTHESIA ATTENDED CARE: Performed by: OTOLARYNGOLOGY

## 2024-02-19 PROCEDURE — 3600000012 HC SURGERY LEVEL 2 ADDTL 15MIN: Performed by: OTOLARYNGOLOGY

## 2024-02-19 RX ORDER — SODIUM CHLORIDE 0.9 % (FLUSH) 0.9 %
5-40 SYRINGE (ML) INJECTION PRN
Status: DISCONTINUED | OUTPATIENT
Start: 2024-02-19 | End: 2024-02-19 | Stop reason: HOSPADM

## 2024-02-19 RX ORDER — ROCURONIUM BROMIDE 50 MG/5 ML
SYRINGE (ML) INTRAVENOUS PRN
Status: DISCONTINUED | OUTPATIENT
Start: 2024-02-19 | End: 2024-02-19 | Stop reason: SDUPTHER

## 2024-02-19 RX ORDER — SODIUM CHLORIDE, SODIUM LACTATE, POTASSIUM CHLORIDE, CALCIUM CHLORIDE 600; 310; 30; 20 MG/100ML; MG/100ML; MG/100ML; MG/100ML
INJECTION, SOLUTION INTRAVENOUS CONTINUOUS
Status: DISCONTINUED | OUTPATIENT
Start: 2024-02-19 | End: 2024-02-19 | Stop reason: HOSPADM

## 2024-02-19 RX ORDER — VORTIOXETINE 20 MG/1
20 TABLET, FILM COATED ORAL DAILY
COMMUNITY

## 2024-02-19 RX ORDER — SODIUM CHLORIDE 9 MG/ML
INJECTION, SOLUTION INTRAVENOUS PRN
Status: DISCONTINUED | OUTPATIENT
Start: 2024-02-19 | End: 2024-02-19 | Stop reason: HOSPADM

## 2024-02-19 RX ORDER — LIDOCAINE HYDROCHLORIDE 20 MG/ML
INJECTION, SOLUTION EPIDURAL; INFILTRATION; INTRACAUDAL; PERINEURAL PRN
Status: DISCONTINUED | OUTPATIENT
Start: 2024-02-19 | End: 2024-02-19 | Stop reason: SDUPTHER

## 2024-02-19 RX ORDER — LIDOCAINE HYDROCHLORIDE AND EPINEPHRINE 10; 10 MG/ML; UG/ML
INJECTION, SOLUTION INFILTRATION; PERINEURAL PRN
Status: DISCONTINUED | OUTPATIENT
Start: 2024-02-19 | End: 2024-02-19 | Stop reason: ALTCHOICE

## 2024-02-19 RX ORDER — CEPHALEXIN 500 MG/1
500 CAPSULE ORAL 3 TIMES DAILY
Qty: 21 CAPSULE | Refills: 0 | Status: SHIPPED | OUTPATIENT
Start: 2024-02-19 | End: 2024-02-26

## 2024-02-19 RX ORDER — ONDANSETRON 2 MG/ML
4 INJECTION INTRAMUSCULAR; INTRAVENOUS
Status: DISCONTINUED | OUTPATIENT
Start: 2024-02-19 | End: 2024-02-19 | Stop reason: HOSPADM

## 2024-02-19 RX ORDER — SODIUM CHLORIDE 9 MG/ML
INJECTION, SOLUTION INTRAVENOUS CONTINUOUS
Status: DISCONTINUED | OUTPATIENT
Start: 2024-02-19 | End: 2024-02-19 | Stop reason: HOSPADM

## 2024-02-19 RX ORDER — LIDOCAINE HYDROCHLORIDE 10 MG/ML
1 INJECTION, SOLUTION EPIDURAL; INFILTRATION; INTRACAUDAL; PERINEURAL
Status: DISCONTINUED | OUTPATIENT
Start: 2024-02-20 | End: 2024-02-19 | Stop reason: HOSPADM

## 2024-02-19 RX ORDER — OXYMETAZOLINE HYDROCHLORIDE 0.05 G/100ML
SPRAY NASAL PRN
Status: DISCONTINUED | OUTPATIENT
Start: 2024-02-19 | End: 2024-02-19 | Stop reason: ALTCHOICE

## 2024-02-19 RX ORDER — FENTANYL CITRATE 50 UG/ML
25 INJECTION, SOLUTION INTRAMUSCULAR; INTRAVENOUS EVERY 5 MIN PRN
Status: DISCONTINUED | OUTPATIENT
Start: 2024-02-19 | End: 2024-02-19 | Stop reason: HOSPADM

## 2024-02-19 RX ORDER — PROPOFOL 10 MG/ML
INJECTION, EMULSION INTRAVENOUS PRN
Status: DISCONTINUED | OUTPATIENT
Start: 2024-02-19 | End: 2024-02-19 | Stop reason: SDUPTHER

## 2024-02-19 RX ORDER — HYDROMORPHONE HYDROCHLORIDE 1 MG/ML
0.5 INJECTION, SOLUTION INTRAMUSCULAR; INTRAVENOUS; SUBCUTANEOUS EVERY 5 MIN PRN
Status: DISCONTINUED | OUTPATIENT
Start: 2024-02-19 | End: 2024-02-19 | Stop reason: HOSPADM

## 2024-02-19 RX ORDER — ASPIRIN 81 MG/1
81 TABLET ORAL DAILY
Status: ON HOLD | COMMUNITY
End: 2024-02-19 | Stop reason: HOSPADM

## 2024-02-19 RX ORDER — OXYMETAZOLINE HYDROCHLORIDE 0.05 G/100ML
2 SPRAY NASAL ONCE
Status: COMPLETED | OUTPATIENT
Start: 2024-02-19 | End: 2024-02-19

## 2024-02-19 RX ORDER — SODIUM CHLORIDE 0.9 % (FLUSH) 0.9 %
5-40 SYRINGE (ML) INJECTION EVERY 12 HOURS SCHEDULED
Status: DISCONTINUED | OUTPATIENT
Start: 2024-02-19 | End: 2024-02-19 | Stop reason: HOSPADM

## 2024-02-19 RX ORDER — DEXTROAMPHETAMINE SACCHARATE, AMPHETAMINE ASPARTATE, DEXTROAMPHETAMINE SULFATE AND AMPHETAMINE SULFATE 5; 5; 5; 5 MG/1; MG/1; MG/1; MG/1
TABLET ORAL
COMMUNITY
Start: 2024-01-15

## 2024-02-19 RX ADMIN — FENTANYL CITRATE 25 MCG: 50 INJECTION INTRAMUSCULAR; INTRAVENOUS at 12:36

## 2024-02-19 RX ADMIN — Medication 30 MG: at 11:02

## 2024-02-19 RX ADMIN — PROPOFOL 200 MG: 10 INJECTION, EMULSION INTRAVENOUS at 11:02

## 2024-02-19 RX ADMIN — PHENYLEPHRINE HYDROCHLORIDE 100 MCG: 10 INJECTION INTRAVENOUS at 11:32

## 2024-02-19 RX ADMIN — HYDROMORPHONE HYDROCHLORIDE 0.5 MG: 1 INJECTION, SOLUTION INTRAMUSCULAR; INTRAVENOUS; SUBCUTANEOUS at 12:10

## 2024-02-19 RX ADMIN — Medication 2000 MG: at 10:59

## 2024-02-19 RX ADMIN — FENTANYL CITRATE 25 MCG: 50 INJECTION INTRAMUSCULAR; INTRAVENOUS at 12:41

## 2024-02-19 RX ADMIN — OXYMETAZOLINE HYDROCHLORIDE 2 SPRAY: 0.05 SPRAY NASAL at 09:50

## 2024-02-19 RX ADMIN — LIDOCAINE HYDROCHLORIDE 6 MG: 20 INJECTION, SOLUTION EPIDURAL; INFILTRATION; INTRACAUDAL; PERINEURAL at 11:02

## 2024-02-19 RX ADMIN — HYDROMORPHONE HYDROCHLORIDE 0.5 MG: 1 INJECTION, SOLUTION INTRAMUSCULAR; INTRAVENOUS; SUBCUTANEOUS at 12:16

## 2024-02-19 RX ADMIN — SODIUM CHLORIDE, POTASSIUM CHLORIDE, SODIUM LACTATE AND CALCIUM CHLORIDE: 600; 310; 30; 20 INJECTION, SOLUTION INTRAVENOUS at 09:12

## 2024-02-19 ASSESSMENT — PAIN SCALES - GENERAL
PAINLEVEL_OUTOF10: 8
PAINLEVEL_OUTOF10: 6
PAINLEVEL_OUTOF10: 8
PAINLEVEL_OUTOF10: 3
PAINLEVEL_OUTOF10: 6

## 2024-02-19 ASSESSMENT — PAIN DESCRIPTION - DESCRIPTORS: DESCRIPTORS: BURNING

## 2024-02-19 ASSESSMENT — PAIN DESCRIPTION - ORIENTATION: ORIENTATION: LEFT

## 2024-02-19 ASSESSMENT — PAIN DESCRIPTION - LOCATION: LOCATION: NOSE

## 2024-02-19 ASSESSMENT — PAIN - FUNCTIONAL ASSESSMENT
PAIN_FUNCTIONAL_ASSESSMENT: FACE, LEGS, ACTIVITY, CRY, AND CONSOLABILITY (FLACC)
PAIN_FUNCTIONAL_ASSESSMENT: 0-10

## 2024-02-19 NOTE — H&P
Interval H&P Note    Pt Name: Michelle Lind  MRN: [id number]  YOB: 1945  Date of evaluation: 2/19/2024      [x] I have reviewed the hard copy ENT progress note by Dr. Muro dated 01/24/2024, labeled in paper chart for an Interval History and Physical note.     [x] I have examined  Michelle Lind, a 78 y.o. female.There are no changes to the patient who is scheduled for NASAL ENDOSCOPY WITH FOREIGN BODY REMOVAL by José Muro MD for Foreign body in nose, initial encounter. The patient denies new health changes, fever, chills, wheezing, cough, increased SOB, chest pain, open sores or wounds. Denies hx of diabetes. Last aspirin 02/16/2024.    Vital signs: /81   Pulse 70   Temp 97 °F (36.1 °C)   Resp 18   Ht 1.676 m (5' 6\")   Wt 78.5 kg (173 lb)   SpO2 99%   BMI 27.92 kg/m²     Allergies:  Patient has no known allergies.    Medications:    Prior to Admission medications    Medication Sig Start Date End Date Taking? Authorizing Provider   aspirin 81 MG EC tablet Take 1 tablet by mouth daily   Yes Karol Aparicio MD   amphetamine-dextroamphetamine (ADDERALL) 20 MG tablet take 1/2 tablet by mouth three times a day 1/15/24  Yes Karol Aparicio MD   TRINTELLIX 20 MG TABS tablet Take 1 tablet by mouth daily    Karol Aparicio MD   tobramycin-dexamethasone (TOBRADEX) 0.3-0.1 % ophthalmic suspension instill 1 drop into left eye three times a day for 7 days 3/3/23   Karol Aparicio MD   naproxen (NAPROSYN) 500 MG tablet Take 1 tablet by mouth 2 times daily (with meals) 3/9/23   Clari Nassar DO   dilTIAZem (CARDIZEM CD) 120 MG extended release capsule TAKE ONE CAPSULE BY MOUTH ONE TIME DAILY 10/5/22   Clari Nassar DO   vitamin D 25 MCG (1000 UT) CAPS Take 1 capsule by mouth daily    Karol Aparicio MD   estradiol (ESTRACE) 0.1 MG/GM vaginal cream Insert 2g daily for 2 weeks then insert 1g three times per week 1/31/22   Clari Nassar DO   magnesium oxide (MAG-OX)

## 2024-02-19 NOTE — BRIEF OP NOTE
Brief Postoperative Note      Patient: Michelle Lind  YOB: 1945  MRN: [id number]    Date of Procedure: 2/19/2024    Pre-Op Diagnosis Codes:     * Foreign body in nose, initial encounter [T17.1XXA]    Post-Op Diagnosis: Same       Procedure(s):  NASAL ENDOSCOPY WITH FOREIGN BODY REMOVAL    Surgeon(s):  José Muro MD    Assistant:  * No surgical staff found *    Anesthesia: General    Estimated Blood Loss (mL): Minimal    Complications: None    Specimens:   * No specimens in log *    Implants:  * No implants in log *      Drains: * No LDAs found *    Findings: glass garcia tube removed      Electronically signed by José Muro MD on 2/19/2024 at 12:22 PM

## 2024-02-19 NOTE — DISCHARGE INSTRUCTIONS
Tylenol or motrin for pain   Follow up with me or optho in 1 week   Change nasal drip pad as needed without occluding nostrils with it   Expect mucousy bloody drainage for a few days             SEPTOPLASTY/TURBINOPLASTY (FESS), POSTOP       Ocean nasal spray - two sprays in each nostril every 1 - 2 hours while awake for six weeks after surgery.     Afrin nasal spray - two sprays two times a day for four days post-op and as need for a nosebleed     Humidifier to bedside     May resume normal diet as tolerated.     Change sniffer dressing as needed.     No heavy lifting or straining for one week post-op.     No Aspirin or Motrin for one week post-op.     DO NOT blow your nose heavily for two weeks post-op. (light blowing is OK)     When sneezing keep your mouth open.     Nasal congestion for 1 - 2 weeks is normal.     Bleeding or oozing from the nose should become less every day and should stop completely by post-op day #3.     Mucus production increases for the first week, then the mucus and crusting will decrease over the next four weeks.

## 2024-02-19 NOTE — ANESTHESIA POSTPROCEDURE EVALUATION
Department of Anesthesiology  Postprocedure Note    Patient: Michelle Lind  MRN: [id number]  YOB: 1945  Date of evaluation: 2/19/2024    Procedure Summary       Date: 02/19/24 Room / Location: [address]    Anesthesia Start: 1059 Anesthesia Stop: 1207    Procedure: NASAL ENDOSCOPY WITH FOREIGN BODY REMOVAL (Left: Nose) Diagnosis:       Foreign body in nose, initial encounter      (Foreign body in nose, initial encounter [T17.1XXA])    Surgeons: José Muro MD Responsible Provider: Marge Nieves MD    Anesthesia Type: general ASA Status: 2            Anesthesia Type: No value filed.    Dc Phase I: Dc Score: 10    Dc Phase II: Dc Score: 10    Anesthesia Post Evaluation    Patient location during evaluation: PACU  Patient participation: complete - patient participated  Level of consciousness: awake and alert  Airway patency: patent  Nausea & Vomiting: no nausea and no vomiting  Cardiovascular status: hemodynamically stable  Respiratory status: acceptable  Hydration status: euvolemic  Pain management: adequate    No notable events documented.

## 2024-02-19 NOTE — ANESTHESIA PRE PROCEDURE
Facility-Administered Medications   Medication Dose Route Frequency Provider Last Rate Last Admin   • [START ON 2/20/2024] lidocaine PF 1 % injection 1 mL  1 mL IntraDERmal Once PRN Xiomara Curran MD       • 0.9 % sodium chloride infusion   IntraVENous Continuous Xiomara Curran MD       • lactated ringers IV soln infusion   IntraVENous Continuous Xiomara Curran  mL/hr at 02/19/24 0912 New Bag at 02/19/24 0912   • ceFAZolin (ANCEF) 2000 mg in 0.9% sodium chloride 50 mL IVPB  2,000 mg IntraVENous Once José Muro MD           Allergies:  No Known Allergies    Problem List:    Patient Active Problem List   Diagnosis Code   • Arthritis M19.90   • Depression F32.A   • PVC (premature ventricular contraction) I49.3   • Chronic constipation K59.09   • Generalized abdominal pain R10.84   • Hip pain M25.559   • Hyperlipidemia E78.5   • Osteopenia M85.80   • Other specified abnormal findings of blood chemistry R79.89   • Senile osteoporosis M81.0   • 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   • Volume depletion E86.9   • Ventricular premature beats I49.3   • Cervical radiculopathy M54.12   • Recurrent major depressive disorder, in partial remission (HCC) F33.41       Past Medical History:        Diagnosis Date   • 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, bowel and GB, 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    • History of blood transfusion    • History of neck surgery 07/2019   • PVC (premature ventricular contraction)    • PVC (premature ventricular contraction)        Past Surgical History:        Procedure Laterality Date   • APPENDECTOMY     • CARPAL TUNNEL RELEASE      2003 + 2007   • CHOLECYSTECTOMY     • COLONOSCOPY     • ENDOSCOPY, COLON, DIAGNOSTIC     • EYE SURGERY      tubes - tear duct   • GALLBLADDER SURGERY     • HYSTERECTOMY (CERVIX STATUS UNKNOWN)     • JOINT REPLACEMENT     • NECK SURGERY  07/2019    C-spine discectomy and fusion   • SMALL INTESTINE SURGERY      Bowel resection 1997   • SANTIAGO AND BSO (CERVIX REMOVED)      1997   •

## 2024-02-19 NOTE — OP NOTE
[address] [address]                                OPERATIVE REPORT    PATIENT NAME: LORRAINE MOSQUERA                     :        1945  MED REC NO:   [id number]                             ROOM:  ACCOUNT NO:   [id number]                           ADMIT DATE: 2024  PROVIDER:     José Muro    DATE OF PROCEDURE:  2024    PREOPERATIVE DIAGNOSIS:  Left intranasal foreign body.    POSTOPERATIVE DIAGNOSIS:  Left intranasal foreign body.    OPERATION PERFORMED:  1.  Nasal endoscopy with removal of left intranasal foreign bodies.  2.  Extra time required for difficult dissection as foreign body was  embedded into the left lateral nasal wall.    SURGEON:  José Muro MD    ANESTHESIA:  General endotracheal.    ESTIMATED BLOOD LOSS:  10 mL.    OPERATIVE FINDINGS:  She had a glass Glover tube in the left nasolacrimal  duct that had migrated inferiorly and could not be removed in the  office.  Her ophthalmologist sent her to me and asked me to remove that  Glover tube, so he can redo a revision dacryocystorhinostomy at a later  date.  Risks, benefits, and alternatives of the surgery were expressed  in detail.  Informed consent was then obtained after all questions were  answered.    OPERATIVE PROCEDURE:  The patient was taken to the operating room and  placed supine on the operating room table.  After adequate general  anesthesia was achieved, she was then prepped and draped in the standard  surgical fashion.  At this point, the nose was decongested using Afrin  nasal spray followed by 1% lidocaine with 1:100,000 epinephrine which  was used and injected in the lateral nasal wall around the foreign body.  Procedure was then begun using a 0-degree nasal endoscope for evaluating  the bilateral nasal cavities, identifying the foreign body on the left.   Foreign body was coming from the left nasolacrimal duct.  Upon

## 2024-07-12 DIAGNOSIS — N95.2 VAGINAL ATROPHY: ICD-10-CM

## 2024-07-12 RX ORDER — ESTRADIOL 0.1 MG/G
CREAM VAGINAL
Qty: 42.5 G | Refills: 3 | Status: SHIPPED | OUTPATIENT
Start: 2024-07-12

## 2024-07-12 NOTE — TELEPHONE ENCOUNTER
Incoming rx request from pharmacy     Last written 5/10/23    Last refilled 12/20/23    No future appointments.    Not seen at [facility]

## 2024-09-24 PROBLEM — F90.9 ATTENTION DEFICIT HYPERACTIVITY DISORDER (ADHD): Status: ACTIVE | Noted: 2024-09-24

## 2024-09-24 PROBLEM — N95.2 VAGINAL ATROPHY: Status: ACTIVE | Noted: 2024-09-24

## 2024-09-24 PROBLEM — Z87.19 HISTORY OF SMALL BOWEL OBSTRUCTION: Status: ACTIVE | Noted: 2024-09-24

## 2024-09-24 PROBLEM — F33.1 MODERATE EPISODE OF RECURRENT MAJOR DEPRESSIVE DISORDER (HCC): Status: ACTIVE | Noted: 2024-09-24

## 2024-10-14 DIAGNOSIS — N95.2 VAGINAL ATROPHY: ICD-10-CM

## 2024-10-14 RX ORDER — CLOBETASOL PROPIONATE 0.5 MG/G
CREAM TOPICAL
COMMUNITY
End: 2024-10-14 | Stop reason: SDUPTHER

## 2024-10-14 RX ORDER — CLOBETASOL PROPIONATE 0.5 MG/G
CREAM TOPICAL
Qty: 45 G | Refills: 1 | Status: SHIPPED | OUTPATIENT
Start: 2024-10-14

## 2024-10-14 RX ORDER — ESTRADIOL 0.1 MG/G
CREAM VAGINAL
Qty: 42.5 G | Refills: 3 | Status: SHIPPED | OUTPATIENT
Start: 2024-10-14

## 2024-10-14 NOTE — TELEPHONE ENCOUNTER
Pt request refill on estradiol and clobetasol Last annual was April 2024( scanned in last annual note) -scripts were sent to [facility] and they closed and didn't transfer. [facility]  is requesting new rx

## 2024-10-17 ENCOUNTER — HOSPITAL ENCOUNTER (OUTPATIENT)
Age: 79
Setting detail: SPECIMEN
Discharge: HOME | End: 2024-10-17

## 2024-10-17 DIAGNOSIS — R53.83 OTHER FATIGUE: ICD-10-CM

## 2024-10-17 DIAGNOSIS — F33.1 MODERATE EPISODE OF RECURRENT MAJOR DEPRESSIVE DISORDER (HCC): ICD-10-CM

## 2024-10-17 LAB — TSH SERPL DL<=0.05 MIU/L-ACNC: 1.58 UIU/ML (ref 0.27–4.2)

## 2024-12-04 DIAGNOSIS — N95.2 VAGINAL ATROPHY: ICD-10-CM

## 2024-12-04 RX ORDER — ESTRADIOL 0.1 MG/G
CREAM VAGINAL
Qty: 42.5 G | Refills: 3 | Status: SHIPPED | OUTPATIENT
Start: 2024-12-04

## 2025-07-10 ENCOUNTER — HOSPITAL ENCOUNTER (OUTPATIENT)
Dept: MAMMOGRAPHY | Age: 80
Discharge: HOME | End: 2025-07-12
Payer: MEDICARE

## 2025-07-10 DIAGNOSIS — Z12.31 BREAST CANCER SCREENING BY MAMMOGRAM: ICD-10-CM

## 2025-07-10 DIAGNOSIS — M81.0 SENILE OSTEOPOROSIS: ICD-10-CM

## 2025-07-10 PROCEDURE — 77080 DXA BONE DENSITY AXIAL: CPT

## 2025-07-10 PROCEDURE — 77063 BREAST TOMOSYNTHESIS BI: CPT

## 2025-07-22 ENCOUNTER — HOSPITAL ENCOUNTER (OUTPATIENT)
Dept: GENERAL RADIOLOGY | Facility: CLINIC | Age: 80
Discharge: HOME | End: 2025-07-24
Payer: MEDICARE

## 2025-07-22 DIAGNOSIS — M50.30 DDD (DEGENERATIVE DISC DISEASE), CERVICAL: ICD-10-CM

## 2025-07-22 PROCEDURE — 72040 X-RAY EXAM NECK SPINE 2-3 VW: CPT

## 2025-07-28 ENCOUNTER — HOSPITAL ENCOUNTER (EMERGENCY)
Facility: CLINIC | Age: 80
Discharge: HOME | End: 2025-07-28
Attending: SPECIALIST
Payer: MEDICARE

## 2025-07-28 VITALS
OXYGEN SATURATION: 100 % | DIASTOLIC BLOOD PRESSURE: 84 MMHG | BODY MASS INDEX: 26.32 KG/M2 | HEART RATE: 61 BPM | RESPIRATION RATE: 22 BRPM | TEMPERATURE: 97.8 F | SYSTOLIC BLOOD PRESSURE: 184 MMHG | WEIGHT: 163 LBS

## 2025-07-28 DIAGNOSIS — S05.02XA ABRASION OF LEFT CORNEA, INITIAL ENCOUNTER: Primary | ICD-10-CM

## 2025-07-28 PROCEDURE — 6370000000 HC RX 637 (ALT 250 FOR IP)

## 2025-07-28 PROCEDURE — 99283 EMERGENCY DEPT VISIT LOW MDM: CPT

## 2025-07-28 RX ORDER — TETRACAINE HYDROCHLORIDE 5 MG/ML
2 SOLUTION OPHTHALMIC ONCE
Status: COMPLETED | OUTPATIENT
Start: 2025-07-28 | End: 2025-07-28

## 2025-07-28 RX ORDER — KETOROLAC TROMETHAMINE 4 MG/ML
1 SOLUTION/ DROPS OPHTHALMIC 4 TIMES DAILY
Qty: 5 ML | Refills: 0 | Status: SHIPPED | OUTPATIENT
Start: 2025-07-28

## 2025-07-28 RX ADMIN — TETRACAINE HYDROCHLORIDE 2 DROP: 5 SOLUTION OPHTHALMIC at 20:23

## 2025-07-28 ASSESSMENT — PAIN - FUNCTIONAL ASSESSMENT: PAIN_FUNCTIONAL_ASSESSMENT: 0-10

## 2025-07-28 ASSESSMENT — VISUAL ACUITY
OD: 20/50
OU: 20/40
OS: 20/200

## 2025-07-28 ASSESSMENT — PAIN DESCRIPTION - LOCATION: LOCATION: EYE

## 2025-07-28 ASSESSMENT — PAIN DESCRIPTION - DESCRIPTORS: DESCRIPTORS: SHARP

## 2025-07-28 ASSESSMENT — PAIN DESCRIPTION - FREQUENCY: FREQUENCY: CONTINUOUS

## 2025-07-28 ASSESSMENT — PAIN DESCRIPTION - PAIN TYPE: TYPE: ACUTE PAIN

## 2025-07-28 ASSESSMENT — PAIN DESCRIPTION - ORIENTATION: ORIENTATION: LEFT

## 2025-07-29 NOTE — ED NOTES
RN paged [facility] at this time for Dr Gary. [facility] states that there is no [facility] and they can not page.

## 2025-07-29 NOTE — ED PROVIDER NOTES
[facility]  EMERGENCY DEPARTMENT ENCOUNTER      Pt Name: Michelle Lind  MRN: [id number]  Birthdate 1945  Date of evaluation: 7/28/2025  Provider: ESHA Day NP  9:22 PM    CHIEF COMPLAINT       Chief Complaint   Patient presents with    Eye Pain     Pt presents to ED with complaints of lt eye pain following a cold infusion skin treatment that she had completed earlier today. Pt states since 1600 the pain has gotten worse making it hard to open her eye. Pt drove herself to ER.          HISTORY OF PRESENT ILLNESS    Michelle Lind is a 79 y.o. female who presents to the emergency department     Patient is a 79-year-old female presenting the ER today for evaluation of pain to her left eye.  She reports that she went to a spot today was receiving a facial peel they had put some numbing cream on her face.  And then some eye protection for the laser.  They did the laser treatment and afterwards she began to have some burning in her left eye initially turned red and she had gone home and after the numbing of her face had completely resolved she began to have worsening pain in her left eye which progressed until she came to the ER.  She denies any trauma associated with this.  Does have pretty rough vision in the left eye anyways.  Reports it is very painful to even open her eye.  No chest pain no shortness of breath no nausea no vomiting no constipation no diarrhea no lower urinary tract symptoms.    The history is provided by the patient.       Nursing Notes were reviewed.    REVIEW OF SYSTEMS       Review of Systems   All other systems reviewed and are negative.      Except as noted above the remainder of the review of systems was reviewed and negative.       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

## 2025-07-29 NOTE — ED PROVIDER NOTES
Emergency Department     Faculty Attestation    I performed a history and physical examination of the patient and discussed management with the mid level provideer. I reviewed the mid level provider's note and agree with the documented findings and plan of care. Any areas of disagreement are noted on the chart. I was personally present for the key portions of any procedures. I have documented in the chart those procedures where I was not present during the key portions. I have reviewed the emergency nurses triage note. I agree with the chief complaint, past medical history, past surgical history, allergies, medications, social and family history as documented unless otherwise noted below. Documentation of the HPI, Physical Exam and Medical Decision Making performed by medical students or scribes is based on my personal performance of the HPI, PE and MDM. For Physician Assistant/ Nurse Practitioner cases/documentation I have personally evaluated this patient and have completed at least one if not all key elements of the E/M (history, physical exam, and MDM). Additional findings are as noted.      Primary Care Physician:  Dennis Leal MD    CHIEF COMPLAINT       Chief Complaint   Patient presents with    Eye Pain     Pt presents to ED with complaints of lt eye pain following a cold infusion skin treatment that she had completed earlier today. Pt states since 1600 the pain has gotten worse making it hard to open her eye. Pt drove herself to ER.        RECENT VITALS:   Temp: 97.8 °F (36.6 °C),  Pulse: 61, Respirations: 22, BP: (!) 184/84    LABS:  Labs Reviewed - No data to display      PERTINENT ATTENDING PHYSICIAN COMMENTS:    79-year-old female patient presented to the emergency department for evaluation of pain, redness, light sensitivity and decreased vision with the left eye.  Patient underwent facial treatment when she received a facial peeling after some numbing cream was put on her face.  Patient

## (undated) DEVICE — SYRINGE MED 10ML TRNSLUC BRL PLUNG BLK MRK POLYPR CTRL

## (undated) DEVICE — TUBES STOMACH 48 IN X 16FR DBL LUMN SUMP SALEM ARGYLE ENFIT

## (undated) DEVICE — CODMAN® SURGICAL PATTIES 1/2" X 3" (1.27CM X 7.62CM): Brand: CODMAN®

## (undated) DEVICE — STAZ MINOR BASIN: Brand: MEDLINE INDUSTRIES, INC.

## (undated) DEVICE — NEEDLE SPNL L4.5IN OD22GA QNCKE TYP SPINOCAN

## (undated) DEVICE — ELECTRODE PT RET AD L9FT HI MOIST COND ADH HYDRGEL CORDED

## (undated) DEVICE — 4-PORT MANIFOLD: Brand: NEPTUNE 2

## (undated) DEVICE — BOOT,SUTURE,STANDARD,YELLOW-IN-BLUE: Brand: MEDLINE

## (undated) DEVICE — DRAPE,REIN 53X77,STERILE: Brand: MEDLINE

## (undated) DEVICE — TRAP,MUCUS SPECIMEN, 80CC: Brand: MEDLINE

## (undated) DEVICE — MARKER,SKIN,WI/RULER AND LABELS: Brand: MEDLINE

## (undated) DEVICE — GOWN,AURORA,NON-REINFORCED,2XL: Brand: MEDLINE

## (undated) DEVICE — KIT,ANTI FOG,W/SPONGE & FLUID,SOFT PACK: Brand: MEDLINE

## (undated) DEVICE — GLOVE SURG 9 PF CRM STRL SENSICARE PI MIC LF

## (undated) DEVICE — MEDICINE CUP, GRADUATED, STER: Brand: MEDLINE

## (undated) DEVICE — SPLINT 1527015 10PK PAIR REUTER LRG THK

## (undated) DEVICE — NEEDLE HYPO 25GA L1.5IN BLU POLYPR HUB S STL REG BVL STR

## (undated) DEVICE — GAUZE,SPONGE,4"X4",16PLY,XRAY,STRL,LF: Brand: MEDLINE

## (undated) DEVICE — BLADE 1884380EM QUADCUT 4.3MMX13CM ROHS: Brand: ROTATABLE FUSION®

## (undated) DEVICE — SYRINGE 20ML LL S/C 50

## (undated) DEVICE — TUBING 1895522 5PK STRAIGHTSHOT TO XPS: Brand: STRAIGHTSHOT®

## (undated) DEVICE — DRAPE,EENT,SPLIT,STERILE: Brand: MEDLINE

## (undated) DEVICE — CONTAINER,SPECIMEN,OR STERILE,4OZ: Brand: MEDLINE

## (undated) DEVICE — SPONGE GZ W2XL2IN NONWOVEN 4 PLY FASTER WICKING ABIL AVANT

## (undated) DEVICE — NEEDLE SPNL 25GA L3.5IN BLU HUB S STL PNCL PNT W/O INTRO